# Patient Record
Sex: MALE | Race: WHITE | NOT HISPANIC OR LATINO | Employment: OTHER | URBAN - METROPOLITAN AREA
[De-identification: names, ages, dates, MRNs, and addresses within clinical notes are randomized per-mention and may not be internally consistent; named-entity substitution may affect disease eponyms.]

---

## 2019-12-17 PROBLEM — F41.9 ANXIETY: Status: ACTIVE | Noted: 2019-12-17

## 2020-02-27 ENCOUNTER — OFFICE VISIT (OUTPATIENT)
Dept: FAMILY MEDICINE CLINIC | Facility: CLINIC | Age: 25
End: 2020-02-27
Payer: COMMERCIAL

## 2020-02-27 VITALS
DIASTOLIC BLOOD PRESSURE: 76 MMHG | BODY MASS INDEX: 31.78 KG/M2 | OXYGEN SATURATION: 98 % | HEIGHT: 73 IN | WEIGHT: 239.8 LBS | SYSTOLIC BLOOD PRESSURE: 122 MMHG | RESPIRATION RATE: 16 BRPM

## 2020-02-27 DIAGNOSIS — Z71.6 ENCOUNTER FOR SMOKING CESSATION COUNSELING: ICD-10-CM

## 2020-02-27 DIAGNOSIS — F41.9 ANXIETY: Primary | ICD-10-CM

## 2020-02-27 DIAGNOSIS — Z13.220 SCREENING, LIPID: ICD-10-CM

## 2020-02-27 DIAGNOSIS — F43.10 PTSD (POST-TRAUMATIC STRESS DISORDER): ICD-10-CM

## 2020-02-27 DIAGNOSIS — E66.9 OBESITY, CLASS I, BMI 30.0-34.9 (SEE ACTUAL BMI): ICD-10-CM

## 2020-02-27 PROBLEM — Z79.899 MEDICAL MARIJUANA USE: Status: ACTIVE | Noted: 2020-02-27

## 2020-02-27 PROBLEM — E66.811 OBESITY, CLASS I, BMI 30.0-34.9 (SEE ACTUAL BMI): Status: ACTIVE | Noted: 2020-02-27

## 2020-02-27 PROBLEM — F10.10 ALCOHOL ABUSE: Status: ACTIVE | Noted: 2020-02-27

## 2020-02-27 PROBLEM — B00.9 HERPES SIMPLEX TYPE 2 INFECTION: Status: ACTIVE | Noted: 2020-02-27

## 2020-02-27 PROCEDURE — 4004F PT TOBACCO SCREEN RCVD TLK: CPT | Performed by: NURSE PRACTITIONER

## 2020-02-27 PROCEDURE — 3008F BODY MASS INDEX DOCD: CPT | Performed by: NURSE PRACTITIONER

## 2020-02-27 PROCEDURE — 99214 OFFICE O/P EST MOD 30 MIN: CPT | Performed by: NURSE PRACTITIONER

## 2020-02-27 RX ORDER — BUSPIRONE HYDROCHLORIDE 5 MG/1
5 TABLET ORAL 2 TIMES DAILY
Qty: 60 TABLET | Refills: 1 | Status: SHIPPED | OUTPATIENT
Start: 2020-02-27 | End: 2021-03-25 | Stop reason: ALTCHOICE

## 2020-02-27 RX ORDER — VARENICLINE TARTRATE 25 MG
KIT ORAL
Qty: 53 TABLET | Refills: 0 | Status: SHIPPED | OUTPATIENT
Start: 2020-02-27 | End: 2021-03-25 | Stop reason: ALTCHOICE

## 2020-02-27 NOTE — PROGRESS NOTES
OFFICE VISIT  Neyda Wade 21 y o  male MRN: [id number]    BMI Counseling: Body mass index is 31 64 kg/m²  The BMI is above normal  Nutrition recommendations include decreasing portion sizes  Exercise recommendations include moderate physical activity 150 minutes/week  Assessment / Plan:  Problem List Items Addressed This Visit        Other    Anxiety - Primary     Will avoid benzodiazepines, start buspar, discussed breathing techniques          Relevant Medications    busPIRone (BUSPAR) 5 mg tablet    PTSD (post-traumatic stress disorder)     Is prescribed the medical marijuana card, uses as needed  Relevant Medications    varenicline (CHANTIX DMITRI) 0 5 MG X 11 & 1 MG X 42 tablet    busPIRone (BUSPAR) 5 mg tablet    Obesity, Class I, BMI 30 0-34 9 (see actual BMI)     Dietary changes as advised, heart healthy diet  Obtain baseline labs          Relevant Orders    Hemoglobin A1C    CBC and differential    Comprehensive metabolic panel    TSH, 3rd generation with Free T4 reflex      Other Visit Diagnoses     Encounter for smoking cessation counseling        Relevant Medications    varenicline (CHANTIX DMITRI) 0 5 MG X 11 & 1 MG X 42 tablet    Screening, lipid        Relevant Orders    Lipid Panel with Direct LDL reflex            Reason For Visit / Chief Complaint  Chief Complaint   Patient presents with    re-establish care        HPI:  Neyda Wade is a 21 y o  male who presents today to [facility] care  He reports attempting to lose weight and attempting to reduce his alcohol intake  He admits to drinking 6 beers daily 7 days a week  He admits to a small amount of cocaine last week  He reports an episode of anxiety, panic attack while driving home from work, frequently occurring  He reports sweaty palms, shortness of breath  He reports calling the ambulance, did not go to the ED  He reported feeling better  He has reduced his cigarettes  He has tried reducing his medial marijuana use       Historical Information   Past Medical History:   Diagnosis Date    Anxiety      History reviewed  No pertinent surgical history  Social History   Social History     Substance and Sexual Activity   Alcohol Use Yes    Alcohol/week: 6 0 standard drinks    Types: 6 Cans of beer per week    Frequency: 4 or more times a week    Drinks per session: 5 or 6    Binge frequency: Weekly     Social History     Substance and Sexual Activity   Drug Use Yes    Types: Marijuana     Social History     Tobacco Use   Smoking Status Current Every Day Smoker    Years: 7 00    Types: Cigarettes   Smokeless Tobacco Current User     Family History   Problem Relation Age of Onset    No Known Problems Mother     No Known Problems Father     No Known Problems Sister     No Known Problems Brother        Meds/Allergies   No Known Allergies    Meds:    Current Outpatient Medications:     busPIRone (BUSPAR) 5 mg tablet, Take 1 tablet (5 mg total) by mouth 2 (two) times a day, Disp: 60 tablet, Rfl: 1    varenicline (CHANTIX DMITRI) 0 5 MG X 11 & 1 MG X 42 tablet, Take one 0 5 mg tablet by mouth once daily for 3 days, then one 0 5 mg tablet by mouth twice daily for 4 days, then one 1 mg tablet by mouth twice daily  , Disp: 53 tablet, Rfl: 0      REVIEW OF SYSTEMS  Review of Systems   Constitutional: Negative for appetite change, fatigue and fever  HENT: Negative for congestion, ear discharge, ear pain and postnasal drip  Eyes: Negative for pain, discharge, redness, itching and visual disturbance  Respiratory: Negative for chest tightness, shortness of breath and wheezing  Cardiovascular: Negative for chest pain, palpitations and leg swelling  Gastrointestinal: Negative for abdominal distention, abdominal pain, blood in stool, diarrhea, nausea and vomiting  Endocrine: Negative for cold intolerance, heat intolerance, polydipsia, polyphagia and polyuria     Genitourinary: Negative for decreased urine volume, difficulty urinating, dysuria, frequency, hematuria, testicular pain and urgency  Musculoskeletal: Negative for arthralgias, back pain, myalgias, neck pain and neck stiffness  Skin: Negative for color change, pallor, rash and wound  Neurological: Negative for dizziness, light-headedness, numbness and headaches  Hematological: Negative for adenopathy  Does not bruise/bleed easily  Psychiatric/Behavioral: Positive for sleep disturbance  Negative for self-injury and suicidal ideas  The patient is nervous/anxious  Current Vitals:   Blood Pressure: 122/76 (02/27/20 1207)  Respirations: 16 (02/27/20 1207)  Height: 6' 1" (185 4 cm) (02/27/20 1207)  Weight - Scale: 109 kg (239 lb 12 8 oz) (02/27/20 1207)  SpO2: 98 % (02/27/20 1207)  [unfilled]    PHYSICAL EXAMS:  Physical Exam   Constitutional: He is oriented to person, place, and time  He appears well-developed and well-nourished  HENT:   Head: Normocephalic and atraumatic  Right Ear: External ear normal    Left Ear: External ear normal    Nose: Nose normal    Mouth/Throat: Oropharynx is clear and moist    Eyes: Pupils are equal, round, and reactive to light  Conjunctivae are normal  Right eye exhibits no discharge  Left eye exhibits no discharge  Neck: Normal range of motion  Neck supple  No thyromegaly present  Cardiovascular: Normal rate, regular rhythm and normal heart sounds  Pulmonary/Chest: Effort normal and breath sounds normal    Abdominal: Soft  Bowel sounds are normal  He exhibits no distension  There is no tenderness  Musculoskeletal: Normal range of motion  He exhibits no edema, tenderness or deformity  Neurological: He is alert and oriented to person, place, and time  Skin: Skin is warm and dry  No rash noted  No erythema  Psychiatric: He has a normal mood and affect  His behavior is normal            Lab, imaging and other studies: I have personally reviewed pertinent reports  Yulia Beal

## 2020-07-31 ENCOUNTER — APPOINTMENT (EMERGENCY)
Dept: ULTRASOUND IMAGING | Facility: HOSPITAL | Age: 25
End: 2020-07-31
Payer: COMMERCIAL

## 2020-07-31 ENCOUNTER — APPOINTMENT (EMERGENCY)
Dept: CT IMAGING | Facility: HOSPITAL | Age: 25
End: 2020-07-31
Payer: COMMERCIAL

## 2020-07-31 ENCOUNTER — HOSPITAL ENCOUNTER (EMERGENCY)
Facility: HOSPITAL | Age: 25
Discharge: HOME | End: 2020-07-31
Attending: EMERGENCY MEDICINE | Admitting: EMERGENCY MEDICINE
Payer: COMMERCIAL

## 2020-07-31 VITALS
SYSTOLIC BLOOD PRESSURE: 129 MMHG | RESPIRATION RATE: 18 BRPM | HEIGHT: 74 IN | DIASTOLIC BLOOD PRESSURE: 62 MMHG | TEMPERATURE: 99.2 F | BODY MASS INDEX: 30.16 KG/M2 | WEIGHT: 235.01 LBS | HEART RATE: 79 BPM | OXYGEN SATURATION: 98 %

## 2020-07-31 DIAGNOSIS — R10.32 LLQ PAIN: ICD-10-CM

## 2020-07-31 DIAGNOSIS — K57.92 ACUTE DIVERTICULITIS: Primary | ICD-10-CM

## 2020-07-31 LAB
ANION GAP SERPL CALCULATED.3IONS-SCNC: 6 MMOL/L (ref 4–13)
BASOPHILS # BLD AUTO: 0.05 THOUSANDS/ΜL (ref 0–0.1)
BASOPHILS NFR BLD AUTO: 0 % (ref 0–1)
BILIRUB UR QL STRIP: NEGATIVE
BUN SERPL-MCNC: 12 MG/DL (ref 5–25)
CALCIUM SERPL-MCNC: 9.7 MG/DL (ref 8.3–10.1)
CHLORIDE SERPL-SCNC: 104 MMOL/L (ref 100–108)
CLARITY UR: CLEAR
CO2 SERPL-SCNC: 27 MMOL/L (ref 21–32)
COLOR UR: YELLOW
CREAT SERPL-MCNC: 0.89 MG/DL (ref 0.6–1.3)
EOSINOPHIL # BLD AUTO: 0.13 THOUSAND/ΜL (ref 0–0.61)
EOSINOPHIL NFR BLD AUTO: 1 % (ref 0–6)
ERYTHROCYTE [DISTWIDTH] IN BLOOD BY AUTOMATED COUNT: 12.2 % (ref 11.6–15.1)
GFR SERPL CREATININE-BSD FRML MDRD: 118 ML/MIN/1.73SQ M
GLUCOSE SERPL-MCNC: 94 MG/DL (ref 65–140)
GLUCOSE UR STRIP-MCNC: NEGATIVE MG/DL
HCT VFR BLD AUTO: 48.4 % (ref 36.5–49.3)
HGB BLD-MCNC: 16.6 G/DL (ref 12–17)
HGB UR QL STRIP.AUTO: NEGATIVE
IMM GRANULOCYTES # BLD AUTO: 0.06 THOUSAND/UL (ref 0–0.2)
IMM GRANULOCYTES NFR BLD AUTO: 0 % (ref 0–2)
KETONES UR STRIP-MCNC: NEGATIVE MG/DL
LEUKOCYTE ESTERASE UR QL STRIP: NEGATIVE
LYMPHOCYTES # BLD AUTO: 1.4 THOUSANDS/ΜL (ref 0.6–4.47)
LYMPHOCYTES NFR BLD AUTO: 9 % (ref 14–44)
MCH RBC QN AUTO: 30.9 PG (ref 26.8–34.3)
MCHC RBC AUTO-ENTMCNC: 34.3 G/DL (ref 31.4–37.4)
MCV RBC AUTO: 90 FL (ref 82–98)
MONOCYTES # BLD AUTO: 0.94 THOUSAND/ΜL (ref 0.17–1.22)
MONOCYTES NFR BLD AUTO: 6 % (ref 4–12)
NEUTROPHILS # BLD AUTO: 13.08 THOUSANDS/ΜL (ref 1.85–7.62)
NEUTS SEG NFR BLD AUTO: 84 % (ref 43–75)
NITRITE UR QL STRIP: NEGATIVE
NRBC BLD AUTO-RTO: 0 /100 WBCS
PH UR STRIP.AUTO: 7.5 [PH]
PLATELET # BLD AUTO: 229 THOUSANDS/UL (ref 149–390)
PMV BLD AUTO: 10 FL (ref 8.9–12.7)
POTASSIUM SERPL-SCNC: 4.6 MMOL/L (ref 3.5–5.3)
PROT UR STRIP-MCNC: NEGATIVE MG/DL
RBC # BLD AUTO: 5.38 MILLION/UL (ref 3.88–5.62)
SODIUM SERPL-SCNC: 137 MMOL/L (ref 136–145)
SP GR UR STRIP.AUTO: 1.01 (ref 1–1.03)
UROBILINOGEN UR QL STRIP.AUTO: 0.2 E.U./DL
WBC # BLD AUTO: 15.66 THOUSAND/UL (ref 4.31–10.16)

## 2020-07-31 PROCEDURE — 96374 THER/PROPH/DIAG INJ IV PUSH: CPT

## 2020-07-31 PROCEDURE — 36415 COLL VENOUS BLD VENIPUNCTURE: CPT | Performed by: PHYSICIAN ASSISTANT

## 2020-07-31 PROCEDURE — 96361 HYDRATE IV INFUSION ADD-ON: CPT

## 2020-07-31 PROCEDURE — 76870 US EXAM SCROTUM: CPT

## 2020-07-31 PROCEDURE — 80048 BASIC METABOLIC PNL TOTAL CA: CPT | Performed by: PHYSICIAN ASSISTANT

## 2020-07-31 PROCEDURE — 81003 URINALYSIS AUTO W/O SCOPE: CPT | Performed by: PHYSICIAN ASSISTANT

## 2020-07-31 PROCEDURE — 74177 CT ABD & PELVIS W/CONTRAST: CPT

## 2020-07-31 PROCEDURE — 99284 EMERGENCY DEPT VISIT MOD MDM: CPT | Performed by: PHYSICIAN ASSISTANT

## 2020-07-31 PROCEDURE — 99284 EMERGENCY DEPT VISIT MOD MDM: CPT

## 2020-07-31 PROCEDURE — 85025 COMPLETE CBC W/AUTO DIFF WBC: CPT | Performed by: PHYSICIAN ASSISTANT

## 2020-07-31 RX ORDER — METRONIDAZOLE 500 MG/1
500 TABLET ORAL ONCE
Status: COMPLETED | OUTPATIENT
Start: 2020-07-31 | End: 2020-07-31

## 2020-07-31 RX ORDER — SULFAMETHOXAZOLE AND TRIMETHOPRIM 800; 160 MG/1; MG/1
1 TABLET ORAL ONCE
Status: COMPLETED | OUTPATIENT
Start: 2020-07-31 | End: 2020-07-31

## 2020-07-31 RX ORDER — KETOROLAC TROMETHAMINE 30 MG/ML
30 INJECTION, SOLUTION INTRAMUSCULAR; INTRAVENOUS ONCE
Status: COMPLETED | OUTPATIENT
Start: 2020-07-31 | End: 2020-07-31

## 2020-07-31 RX ORDER — SULFAMETHOXAZOLE AND TRIMETHOPRIM 800; 160 MG/1; MG/1
1 TABLET ORAL EVERY 12 HOURS SCHEDULED
Qty: 20 TABLET | Refills: 0 | Status: SHIPPED | OUTPATIENT
Start: 2020-07-31 | End: 2020-08-10

## 2020-07-31 RX ORDER — METRONIDAZOLE 500 MG/1
500 TABLET ORAL EVERY 8 HOURS SCHEDULED
Qty: 30 TABLET | Refills: 0 | Status: SHIPPED | OUTPATIENT
Start: 2020-07-31 | End: 2020-08-10

## 2020-07-31 RX ADMIN — SODIUM CHLORIDE 1000 ML: 0.9 INJECTION, SOLUTION INTRAVENOUS at 01:47

## 2020-07-31 RX ADMIN — IOHEXOL 100 ML: 350 INJECTION, SOLUTION INTRAVENOUS at 02:50

## 2020-07-31 RX ADMIN — SULFAMETHOXAZOLE AND TRIMETHOPRIM 1 TABLET: 800; 160 TABLET ORAL at 03:50

## 2020-07-31 RX ADMIN — METRONIDAZOLE 500 MG: 500 TABLET ORAL at 03:50

## 2020-07-31 RX ADMIN — KETOROLAC TROMETHAMINE 30 MG: 30 INJECTION, SOLUTION INTRAMUSCULAR at 01:47

## 2020-07-31 NOTE — ED NOTES
Patient walked out of department stable     Andry Nati, [facility]  07/31/20 1086

## 2020-07-31 NOTE — ED PROVIDER NOTES
History  Chief Complaint   Patient presents with    Abdominal Pain     Patient presents with LLQ abd pain that radiates to the Lt lower back x 1 day states is having left sided groin pain as well since yesterday and has been worse since he "masterbated"      39-year-old male with past medical history significant for anxiety and PTSD who presents to the emergency department for complaint of left lower quadrant abdominal pain beginning yesterday described as aching, increased in intensity tonight after masturbating and now radiating into the left testicle  Patient denies ever experiencing this before  He denies any nausea, vomiting, fever, chills  States he felt constipated yesterday, last bowel movement two days ago, no hematochezia, melena, or mucus stools  Denies any urinary symptoms or hx of UTI, nephrolithiasis  Denies any scrotal swelling, rash, lesion, or other skin color changes  No hx of abdominal surgeries  Patient reports smoking small amounts of marijuana daily, no other drug or large alcohol use  Prior to Admission Medications   Prescriptions Last Dose Informant Patient Reported? Taking?   busPIRone (BUSPAR) 5 mg tablet   No No   Sig: Take 1 tablet (5 mg total) by mouth 2 (two) times a day   varenicline (CHANTIX DMITRI) 0 5 MG X 11 & 1 MG X 42 tablet   No No   Sig: Take one 0 5 mg tablet by mouth once daily for 3 days, then one 0 5 mg tablet by mouth twice daily for 4 days, then one 1 mg tablet by mouth twice daily  Facility-Administered Medications: None       Past Medical History:   Diagnosis Date    Anxiety        No past surgical history on file  Family History   Problem Relation Age of Onset    No Known Problems Mother     No Known Problems Father     No Known Problems Sister     No Known Problems Brother      I have reviewed and agree with the history as documented      E-Cigarette/Vaping    E-Cigarette Use Current Some Day User      E-Cigarette/Vaping Substances    Nicotine Yes     THC Yes     Flavoring No      Social History     Tobacco Use    Smoking status: Current Every Day Smoker     Years: 7 00     Types: Cigarettes    Smokeless tobacco: Current User   Substance Use Topics    Alcohol use: Not Currently     Alcohol/week: 6 0 standard drinks     Types: 6 Cans of beer per week    Drug use: Yes     Types: Marijuana       Review of Systems   Constitutional: Negative for appetite change, chills, fatigue and fever  Gastrointestinal: Positive for abdominal pain and constipation  Negative for abdominal distention, blood in stool, diarrhea, nausea and vomiting  Genitourinary: Positive for testicular pain  Negative for decreased urine volume, difficulty urinating, discharge, dysuria, flank pain, frequency, hematuria, penile pain, penile swelling, scrotal swelling and urgency  Musculoskeletal: Negative for back pain and myalgias  Skin: Negative for color change and rash  Neurological: Negative for dizziness, weakness, light-headedness and headaches  All other systems reviewed and are negative  Physical Exam  Physical Exam   Constitutional: He is oriented to person, place, and time  He appears well-developed and well-nourished  He is cooperative  Non-toxic appearance  No distress  HENT:   Head: Normocephalic and atraumatic  Mouth/Throat: Uvula is midline, oropharynx is clear and moist and mucous membranes are normal    Eyes: Pupils are equal, round, and reactive to light  Conjunctivae and EOM are normal    Neck: Normal range of motion  Neck supple  Cardiovascular: Normal rate, regular rhythm, intact distal pulses and normal pulses  Pulmonary/Chest: Effort normal and breath sounds normal    Abdominal: Soft  Normal appearance and bowel sounds are normal  He exhibits no distension and no mass  There is no hepatosplenomegaly  There is tenderness in the left lower quadrant  There is guarding  There is no rigidity, no rebound and no CVA tenderness  No hernia   Hernia confirmed negative in the right inguinal area and confirmed negative in the left inguinal area  Genitourinary: Penis normal  Right testis shows no mass, no swelling and no tenderness  Right testis is descended  Left testis shows tenderness  Left testis shows no mass and no swelling  Left testis is descended  No discharge found  Musculoskeletal: Normal range of motion  Lymphadenopathy: No inguinal adenopathy noted on the right or left side  Neurological: He is alert and oriented to person, place, and time  Skin: Skin is warm  Capillary refill takes less than 2 seconds  No lesion and no rash noted  No erythema  Vitals reviewed        Vital Signs  ED Triage Vitals   Temperature Pulse Respirations Blood Pressure SpO2   07/31/20 0149 07/31/20 0043 07/31/20 0043 07/31/20 0043 07/31/20 0043   99 2 °F (37 3 °C) 98 20 153/82 100 %      Temp Source Heart Rate Source Patient Position - Orthostatic VS BP Location FiO2 (%)   07/31/20 0149 07/31/20 0043 07/31/20 0043 07/31/20 0043 --   Oral Monitor Lying Right arm       Pain Score       07/31/20 0147       5           Vitals:    07/31/20 0100 07/31/20 0230 07/31/20 0300 07/31/20 0400   BP: 138/70 120/68 124/59 129/62   Pulse: 86 91 79 79   Patient Position - Orthostatic VS: Lying Lying Lying Lying         Visual Acuity      ED Medications  Medications   sodium chloride 0 9 % bolus 1,000 mL (0 mL Intravenous Stopped 7/31/20 0350)   ketorolac (TORADOL) injection 30 mg (30 mg Intravenous Given 7/31/20 0147)   iohexol (OMNIPAQUE) 350 MG/ML injection (MULTI-DOSE) 100 mL (100 mL Intravenous Given 7/31/20 0250)   sulfamethoxazole-trimethoprim (BACTRIM DS) 800-160 mg per tablet 1 tablet (1 tablet Oral Given 7/31/20 0350)   metroNIDAZOLE (FLAGYL) tablet 500 mg (500 mg Oral Given 7/31/20 0350)       Diagnostic Studies  Results Reviewed     Procedure Component Value Units Date/Time    UA w Reflex to Microscopic w Reflex to Culture [[id number]] Collected:  07/31/20 0233    Lab Status:  Final result Specimen:  Urine, Clean Catch Updated:  07/31/20 0241     Color, UA Yellow     Clarity, UA Clear     Specific Gravity, UA 1 015     pH, UA 7 5     Leukocytes, UA Negative     Nitrite, UA Negative     Protein, UA Negative mg/dl      Glucose, UA Negative mg/dl      Ketones, UA Negative mg/dl      Urobilinogen, UA 0 2 E U /dl      Bilirubin, UA Negative     Blood, UA Negative    Basic metabolic panel [[id number]] Collected:  07/31/20 0146    Lab Status:  Final result Specimen:  Blood from Hand, Left Updated:  07/31/20 0206     Sodium 137 mmol/L      Potassium 4 6 mmol/L      Chloride 104 mmol/L      CO2 27 mmol/L      ANION GAP 6 mmol/L      BUN 12 mg/dL      Creatinine 0 89 mg/dL      Glucose 94 mg/dL      Calcium 9 7 mg/dL      eGFR 118 ml/min/1 73sq m     Narrative:       National Kidney Disease Foundation guidelines for Chronic Kidney Disease (CKD):     Stage 1 with normal or high GFR (GFR > 90 mL/min/1 73 square meters)    Stage 2 Mild CKD (GFR = 60-89 mL/min/1 73 square meters)    Stage 3A Moderate CKD (GFR = 45-59 mL/min/1 73 square meters)    Stage 3B Moderate CKD (GFR = 30-44 mL/min/1 73 square meters)    Stage 4 Severe CKD (GFR = 15-29 mL/min/1 73 square meters)    Stage 5 End Stage CKD (GFR <15 mL/min/1 73 square meters)  Note: GFR calculation is accurate only with a steady state creatinine    CBC and differential [[id number]]  (Abnormal) Collected:  07/31/20 0146    Lab Status:  Final result Specimen:  Blood from Hand, Left Updated:  07/31/20 0154     WBC 15 66 Thousand/uL      RBC 5 38 Million/uL      Hemoglobin 16 6 g/dL      Hematocrit 48 4 %      MCV 90 fL      MCH 30 9 pg      MCHC 34 3 g/dL      RDW 12 2 %      MPV 10 0 fL      Platelets 837 Thousands/uL      nRBC 0 /100 WBCs      Neutrophils Relative 84 %      Immat GRANS % 0 %      Lymphocytes Relative 9 %      Monocytes Relative 6 %      Eosinophils Relative 1 %      Basophils Relative 0 %      Neutrophils Absolute 13 08 Thousands/µL      Immature Grans Absolute 0 06 Thousand/uL      Lymphocytes Absolute 1 40 Thousands/µL      Monocytes Absolute 0 94 Thousand/µL      Eosinophils Absolute 0 13 Thousand/µL      Basophils Absolute 0 05 Thousands/µL                  CT abdomen pelvis with contrast   Final Result by Hawa Choi DO (07/31 0300)      Colonic diverticulosis with inflammatory changes around the sigmoid colon which may represent acute diverticulitis  No drainable fluid collection  If not already performed recently, colonoscopy after the acute illness is recommended to rule out possibility of colon cancer mimicking diverticulitis  Workstation performed: [id number]         US scrotum and testicles   Final Result by Eveline Burgess MD (07/31 2546)       1  No evidence of testicular torsion  2   Targeted ultrasound of the left lower quadrant in the area of pain demonstrates a noncompressible bowel loop, incompletely evaluated  Consider further evaluation with CT abdomen and pelvis, if clinically indicated  Workstation performed: [id number]                    Procedures  Procedures         ED Course  ED Course as of Aug 05 1801   Fri Jul 31, 2020   0138 Per tech, US showing non compressible mass in the LLQ  Will await official read, CT if we need further evaluation and patient is agreeable          US AUDIT      Most Recent Value   Initial Alcohol Screen: US AUDIT-C    1  How often do you have a drink containing alcohol? 1 Filed at: 07/31/2020 0044   2  How many drinks containing alcohol do you have on a typical day you are drinking? 1 Filed at: 07/31/2020 0044   3a  Male UNDER 65: How often do you have five or more drinks on one occasion? 0 Filed at: 07/31/2020 0044   3b  FEMALE Any Age, or MALE 65+: How often do you have 4 or more drinks on one occassion?   0 Filed at: 07/31/2020 0044   Audit-C Score  2 Filed at: 07/31/2020 0044                  ASTON/DAST-10      Most Recent Value   How many times in the past year have you    Used an illegal drug or used a prescription medication for non-medical reasons? Never Filed at: 07/31/2020 0044                                MDM  Number of Diagnoses or Management Options  Acute diverticulitis:   LLQ pain:   Diagnosis management comments: On exam, patient is severely tender to light palpation in the left lower quadrant, with guarding, also moderately tender at the left testicle  No scrotal swelling or skin color changes  No suspicious masses palpable on the bilateral testicles  Must rule out testicular torsion, especially as pain began acutely post masturbation, will pursue ultrasound  Also consider UTI, nephrolithiasis, epididymitis, prostatitis, orchitis, acute diverticulitis though this is less likely due to age, constipation, volvulus, colonic mass, acute appendicitis, infectious colitis, enteritis    Will admin Toradol for pain and reassess       Amount and/or Complexity of Data Reviewed  Clinical lab tests: ordered and reviewed  Tests in the radiology section of CPT®: ordered and reviewed  Discussion of test results with the performing providers: yes  Decide to obtain previous medical records or to obtain history from someone other than the patient: yes  Obtain history from someone other than the patient: yes  Review and summarize past medical records: yes  Discuss the patient with other providers: yes  Independent visualization of images, tracings, or specimens: yes    Risk of Complications, Morbidity, and/or Mortality  General comments: See ED course note for dispo and plan  CT showing acute diverticulitis  Will treat with combo of Bactrim and Flagyl  Discussed with patient that due to this type of infection at an early age, he needs to f/u with gastroenterology for colonoscopy after infection resolves, to rule out any other pathology, including colonic malignancy, which may be present and currently being classified as diverticulitis on CT scan   Will refer  Pain improved after meds  Advised antiinflammatory q4-6h with food  Discussed low residue, BRAT diet and only beginning high fiber diet once infection resolves  Instructed return to ED for any sudden worsening of pain or worsening symptoms from day 0 (discharge) to day 3 of antibiotics  He should also return if symptoms are not significantly improved by day 3 of antibiotics, as this may indicate antibiotic failure, which could result in worsening of infection, abscess, fistula, perforation, sepsis, or death if left improperly treated  I reviewed and discussed all lab and imaging findings with the patient at bedside  I discussed emergency department return parameters  I answered any and all questions the patient had regarding emergency department course of evaluation and treatment  The patient verbalized understanding of and agreement with plan  Patient Progress  Patient progress: improved        Disposition  Final diagnoses:   Acute diverticulitis   LLQ pain     Time reflects when diagnosis was documented in both MDM as applicable and the Disposition within this note     Time User Action Codes Description Comment    7/31/2020  3:32 AM Lynda Ge [K57 [id number]] Acute diverticulitis     7/31/2020  3:32 AM Lynda Ge [R10 [id number]] LLQ pain       ED Disposition     ED Disposition Condition Date/Time Comment    Discharge Stable Fri Jul 31, 2020  3:31 AM Tima Wilkinson discharge to home/self care              Follow-up Information     Follow up With Specialties Details Why Contact Info Additional Information    Catalina Bowen,  Family Medicine Schedule an appointment as soon as possible for a visit in 3 days For further evaluation [address] [facility] [address]  [address] [facility] Emergency Medicine Go to  If symptoms worsen [address] [facility]  [phone] [place] ED, [address], [place], [address] Chintan Veliz MD Gastroenterology Schedule an appointment as soon as possible for a visit in 1 week For further evaluation [address]  [facility] [address] [facility]             Discharge Medication List as of 7/31/2020  3:34 AM      START taking these medications    Details   metroNIDAZOLE (FLAGYL) 500 mg tablet Take 1 tablet (500 mg total) by mouth every 8 (eight) hours for 10 days, Starting Fri 7/31/2020, Until Mon 8/10/2020, Normal      sulfamethoxazole-trimethoprim (BACTRIM DS) 800-160 mg per tablet Take 1 tablet by mouth every 12 (twelve) hours for 10 days, Starting Fri 7/31/2020, Until Mon 8/10/2020, Normal         CONTINUE these medications which have NOT CHANGED    Details   busPIRone (BUSPAR) 5 mg tablet Take 1 tablet (5 mg total) by mouth 2 (two) times a day, Starting Thu 2/27/2020, Normal      varenicline (CHANTIX DMITRI) 0 5 MG X 11 & 1 MG X 42 tablet Take one 0 5 mg tablet by mouth once daily for 3 days, then one 0 5 mg tablet by mouth twice daily for 4 days, then one 1 mg tablet by mouth twice daily  , Normal           No discharge procedures on file      PDMP Review     None          ED Provider  Electronically Signed by           Abdulkadir Rick PA-C  08/05/20 2156

## 2020-07-31 NOTE — ED NOTES
Patient transported to [address] [facility], [address] [place] [address]  07/31/20 6242

## 2020-08-02 ENCOUNTER — NURSE TRIAGE (OUTPATIENT)
Dept: OTHER | Facility: OTHER | Age: 25
End: 2020-08-02

## 2020-08-02 NOTE — TELEPHONE ENCOUNTER
Patient called back and said to try calling him on [phone] as he is having problems with this cell phone

## 2020-08-02 NOTE — TELEPHONE ENCOUNTER
Second attempt to reach patient  Goes directly to VM  Unable to leave message, closing chart due to no contact

## 2020-08-02 NOTE — TELEPHONE ENCOUNTER
Reason for Disposition   Mild constipation    Answer Assessment - Initial Assessment Questions  1  STOOL PATTERN OR FREQUENCY: "How often do you pass bowel movements (BMs)?"  (Normal range: tid to q 3 days)  "When was the last BM passed?"        Usually has a BM today  2  STRAINING: "Do you have to strain to have a BM?"       Denies  3  RECTAL PAIN: "Does your rectum hurt when the stool comes out?" If so, ask: "Do you have hemorrhoids? How bad is the pain?"  (Scale 1-10; or mild, moderate, severe)        4  STOOL COMPOSITION: "Are the stools hard?"       Small    5  BLOOD ON STOOLS: "Has there been any blood on the toilet tissue or on the surface of the BM?" If so, ask: "When was the last time?"      Denies    6  CHRONIC CONSTIPATION: "Is this a new problem for you?"  If no, ask: "How long have you had this problem?" (days, weeks, months)       Denies  7  CHANGES IN DIET: "Have there been any recent changes in your diet?"       Has not been eating much  8  MEDICATIONS: "Have you been taking any new medications?"        9  LAXATIVES: "Have you been using any laxatives or enemas?"  If yes, ask "What, how often, and when was the last time?"        10   CAUSE: "What do you think is causing the constipation?"           11  OTHER SYMPTOMS: "Do you have any other symptoms?" (e g , abdominal pain, fever, vomiting)    Protocols used: CONSTIPATION-ADULT-

## 2020-08-02 NOTE — TELEPHONE ENCOUNTER
Regarding: CONSTIPATION   ----- Message from Jairo Khanna sent at 8/2/2020  1:43 PM EDT -----  Patient went to ER diagnosed with diverticulitis on Friday  Patient hasn't had a bowel movement and feels nauseous  Patient wants to know if anything can help him pass a bowel movement  Patient feels blocked

## 2020-08-27 ENCOUNTER — OFFICE VISIT (OUTPATIENT)
Dept: GASTROENTEROLOGY | Facility: CLINIC | Age: 25
End: 2020-08-27

## 2020-08-27 ENCOUNTER — TELEPHONE (OUTPATIENT)
Dept: GASTROENTEROLOGY | Facility: CLINIC | Age: 25
End: 2020-08-27

## 2020-08-27 VITALS
HEART RATE: 98 BPM | DIASTOLIC BLOOD PRESSURE: 80 MMHG | BODY MASS INDEX: 28.62 KG/M2 | TEMPERATURE: 97.5 F | RESPIRATION RATE: 18 BRPM | SYSTOLIC BLOOD PRESSURE: 120 MMHG | HEIGHT: 74 IN | WEIGHT: 223 LBS

## 2020-08-27 DIAGNOSIS — K57.32 DIVERTICULITIS OF LARGE INTESTINE WITHOUT PERFORATION OR ABSCESS WITHOUT BLEEDING: Primary | ICD-10-CM

## 2020-08-27 PROCEDURE — 3008F BODY MASS INDEX DOCD: CPT | Performed by: NURSE PRACTITIONER

## 2020-08-27 PROCEDURE — 4004F PT TOBACCO SCREEN RCVD TLK: CPT | Performed by: PHYSICIAN ASSISTANT

## 2020-08-27 PROCEDURE — 99203 OFFICE O/P NEW LOW 30 MIN: CPT | Performed by: PHYSICIAN ASSISTANT

## 2020-08-27 PROCEDURE — 3008F BODY MASS INDEX DOCD: CPT | Performed by: PHYSICIAN ASSISTANT

## 2020-08-27 NOTE — PROGRESS NOTES
[facility] - Outpatient Consultation  Tima Wilkinson 31 y o  male MRN: [id number]  Encounter: [id number]          ASSESSMENT AND PLAN:      1  Diverticulitis of large intestine without perforation or abscess without bleeding    Patient developed the acute onset of LLQ pain and fever and went to the ER  CT Scan was performed which showed acute diverticulitis  He was treated with a Cipro/Flagyl course and the pain improved  He does have some lingering mild discomfort and bloating at times  He reports a grandparent with colon cancer  Will plan for colonoscopy in 4 weeks (8 weeks after the episode) to rule out an underlying malignancy  Recommend a high fiber diet and probiotic course   ______________________________________________________________________    HPI:  Patient is a 58-year-old male who presents to the office for an evaluation of diverticulitis  Patient reports he developed the acute onset of left lower quadrant pain and fever back in the end of July and went to the ER for an evaluation  A CT scan of the abdomen that was performed which showed evidence of acute diverticulitis  His white blood cell count was also elevated at the time  He was treated with a Cipro and Flagyl course and the pain improved  He does report some lingering bloating and mild discomfort but nothing like the severity he had with the acute episode  He reports he has never had diverticulitis before  He did any issues with diarrhea  He does report occasional constipation  No rectal bleeding  He denies any family history of ulcerative colitis or Crohn's disease  He reports a grandparent with colon cancer  He has never had a colonoscopy before  REVIEW OF SYSTEMS:    CONSTITUTIONAL: Denies any fever, chills, rigors, and weight loss  HEENT: No earache or tinnitus  Denies hearing loss or visual disturbances  CARDIOVASCULAR: No chest pain or palpitations     RESPIRATORY: Denies any cough, hemoptysis, shortness of breath or dyspnea on exertion  GASTROINTESTINAL: As noted in the History of Present Illness  GENITOURINARY: No problems with urination  Denies any hematuria or dysuria  NEUROLOGIC: No dizziness or vertigo, denies headaches  MUSCULOSKELETAL: Denies any muscle or joint pain  SKIN: Denies skin rashes or itching  ENDOCRINE: Denies excessive thirst  Denies intolerance to heat or cold  PSYCHOSOCIAL: Denies depression or anxiety  Denies any recent memory loss  Historical Information   Past Medical History:   Diagnosis Date    Anxiety      History reviewed  No pertinent surgical history  Social History   Social History     Substance and Sexual Activity   Alcohol Use Not Currently    Alcohol/week: 6 0 standard drinks    Types: 6 Cans of beer per week     Social History     Substance and Sexual Activity   Drug Use Yes    Types: Marijuana     Social History     Tobacco Use   Smoking Status Current Every Day Smoker    Years: 7 00    Types: Cigarettes   Smokeless Tobacco Current User     Family History   Problem Relation Age of Onset    No Known Problems Mother     No Known Problems Father     No Known Problems Sister     No Known Problems Brother        Meds/Allergies       Current Outpatient Medications:     busPIRone (BUSPAR) 5 mg tablet    varenicline (CHANTIX DMITRI) 0 5 MG X 11 & 1 MG X 42 tablet    No Known Allergies        Objective     Blood pressure 120/80, pulse 98, temperature 97 5 °F (36 4 °C), resp  rate 18, height 6' 2" (1 88 m), weight 101 kg (223 lb)  Body mass index is 28 63 kg/m²  PHYSICAL EXAM:      General Appearance:   Alert, cooperative, no distress   HEENT:   Normocephalic, atraumatic, anicteric     Neck:  Supple, symmetrical, trachea midline   Lungs:   Clear to auscultation bilaterally; no rales, rhonchi or wheezing; respirations unlabored    Heart[de-identified]   Regular rate and rhythm; no murmur, rub, or gallop     Abdomen:   Soft, non-tender, non-distended; normal bowel sounds; no masses, no organomegaly    Genitalia:   Deferred    Rectal:   Deferred    Extremities:  No cyanosis, clubbing or edema    Pulses:  2+ and symmetric    Skin:  No jaundice, rashes, or lesions    Lymph nodes:  No palpable cervical lymphadenopathy        Lab Results:   No visits with results within 1 Day(s) from this visit     Latest known visit with results is:   Admission on 07/31/2020, Discharged on 07/31/2020   Component Date Value    WBC 07/31/2020 15 66*    RBC 07/31/2020 5 38     Hemoglobin 07/31/2020 16 6     Hematocrit 07/31/2020 48 4     MCV 07/31/2020 90     MCH 07/31/2020 30 9     MCHC 07/31/2020 34 3     RDW 07/31/2020 12 2     MPV 07/31/2020 10 0     Platelets 54/46/4356 229     nRBC 07/31/2020 0     Neutrophils Relative 07/31/2020 84*    Immat GRANS % 07/31/2020 0     Lymphocytes Relative 07/31/2020 9*    Monocytes Relative 07/31/2020 6     Eosinophils Relative 07/31/2020 1     Basophils Relative 07/31/2020 0     Neutrophils Absolute 07/31/2020 13 08*    Immature Grans Absolute 07/31/2020 0 06     Lymphocytes Absolute 07/31/2020 1 40     Monocytes Absolute 07/31/2020 0 94     Eosinophils Absolute 07/31/2020 0 13     Basophils Absolute 07/31/2020 0 05     Sodium 07/31/2020 137     Potassium 07/31/2020 4 6     Chloride 07/31/2020 104     CO2 07/31/2020 27     ANION GAP 07/31/2020 6     BUN 07/31/2020 12     Creatinine 07/31/2020 0 89     Glucose 07/31/2020 94     Calcium 07/31/2020 9 7     eGFR 07/31/2020 118     Color, UA 07/31/2020 Yellow     Clarity, UA 07/31/2020 Clear     Specific Gravity, UA 07/31/2020 1 015     pH, UA 07/31/2020 7 5     Leukocytes, UA 07/31/2020 Negative     Nitrite, UA 07/31/2020 Negative     Protein, UA 07/31/2020 Negative     Glucose, UA 07/31/2020 Negative     Ketones, UA 07/31/2020 Negative     Urobilinogen, UA 07/31/2020 0 2     Bilirubin, UA 07/31/2020 Negative     Blood, UA 07/31/2020 Negative Radiology Results:   Us Scrotum And Testicles    Result Date: 7/31/2020  Narrative: SCROTAL ULTRASOUND INDICATION:    r/o torsion left  COMPARISON: None TECHNIQUE:   Ultrasound the scrotal contents was performed with a high frequency linear transducer utilizing volumetric sweep imaging as well as standard still image techniques  Imaging performed in longitudinal and transverse orientation  Color and spectral Doppler evaluation also performed bilaterally  In addition, targeted ultrasound of the left lower quadrant in the area of pain was performed  FINDINGS: TESTES: Testes are symmetric and normal in size  RIGHT testis = 3 7 x 2 0 x 2 7 cm Normal contour with homogeneous smooth echotexture  No intratesticular mass lesion or calcifications  LEFT testis = 3 4 x 2 1 x 2 3 cm Normal contour with homogeneous smooth echotexture  Echogenic lesion in the anterior aspect of the left testicle measures 2 mm  Doppler flow within both testes is present and symmetric  EPIDIDYMIDES: Normal Size  Doppler ultrasound demonstrates normal blood flow  No epididymal lesions  HYDROCELE:  No significant fluid present  VARICOCELE:  None present  SCROTUM:  Scrotal thickness and appearance within normal limits  No evidence for extratesticular mass or hernia demonstrated  OTHER: Targeted ultrasound of the left lower quadrant in the area of pain demonstrates a noncompressible bowel loop, incompletely evaluated  Impression:  1  No evidence of testicular torsion  2   Targeted ultrasound of the left lower quadrant in the area of pain demonstrates a noncompressible bowel loop, incompletely evaluated  Consider further evaluation with CT abdomen and pelvis, if clinically indicated  Workstation performed: [id number]     Ct Abdomen Pelvis With Contrast    Result Date: 7/31/2020  Narrative: CT ABDOMEN AND PELVIS WITH IV CONTRAST INDICATION:   noncompressible bowel loop on US of LLQ, LLQ and testicular pain   COMPARISON:  Ultrasound obtained earlier today TECHNIQUE:  CT examination of the abdomen and pelvis was performed  Axial, sagittal, and coronal 2D reformatted images were created from the source data and submitted for interpretation  Radiation dose length product (DLP) for this visit:  742 mGy-cm   This examination, like all CT scans performed in the [facility], was performed utilizing techniques to minimize radiation dose exposure, including the use of iterative reconstruction and automated exposure control  IV Contrast:  100 mL of iohexol (OMNIPAQUE) Enteric Contrast:  Enteric contrast was not administered  FINDINGS: ABDOMEN LOWER CHEST:  No clinically significant abnormality identified in the visualized lower chest  LIVER/BILIARY TREE:  Unremarkable  GALLBLADDER:  No calcified gallstones  No pericholecystic inflammatory change  SPLEEN:  Unremarkable  PANCREAS:  Unremarkable  ADRENAL GLANDS:  Unremarkable  KIDNEYS/URETERS:  Unremarkable  No hydronephrosis  STOMACH AND BOWEL:  Colonic diverticulosis with inflammatory changes around the sigmoid colon in the left lower quadrant  APPENDIX:  No findings to suggest appendicitis  ABDOMINOPELVIC CAVITY:  No ascites  No pneumoperitoneum  No lymphadenopathy  VESSELS:  Unremarkable for patient's age  PELVIS REPRODUCTIVE ORGANS:  Unremarkable for patient's age  URINARY BLADDER:  Unremarkable  ABDOMINAL WALL/INGUINAL REGIONS:  Unremarkable  OSSEOUS STRUCTURES:  No acute fracture or destructive osseous lesion  Impression: Colonic diverticulosis with inflammatory changes around the sigmoid colon which may represent acute diverticulitis  No drainable fluid collection  If not already performed recently, colonoscopy after the acute illness is recommended to rule out possibility of colon cancer mimicking diverticulitis   Workstation performed: [id number]

## 2021-03-09 ENCOUNTER — TELEPHONE (OUTPATIENT)
Dept: GASTROENTEROLOGY | Facility: CLINIC | Age: 26
End: 2021-03-09

## 2021-03-10 NOTE — TELEPHONE ENCOUNTER
You saw him for diverticulitis back in august, he never scheduled, just sched or does he need to be seen?

## 2021-03-25 ENCOUNTER — HOSPITAL ENCOUNTER (OUTPATIENT)
Dept: GASTROENTEROLOGY | Facility: HOSPITAL | Age: 26
Setting detail: OUTPATIENT SURGERY
Discharge: HOME | End: 2021-03-25
Attending: INTERNAL MEDICINE | Admitting: INTERNAL MEDICINE
Payer: COMMERCIAL

## 2021-03-25 ENCOUNTER — ANESTHESIA EVENT (OUTPATIENT)
Dept: GASTROENTEROLOGY | Facility: HOSPITAL | Age: 26
End: 2021-03-25

## 2021-03-25 ENCOUNTER — ANESTHESIA (OUTPATIENT)
Dept: GASTROENTEROLOGY | Facility: HOSPITAL | Age: 26
End: 2021-03-25

## 2021-03-25 VITALS
HEART RATE: 54 BPM | HEIGHT: 74 IN | TEMPERATURE: 97.7 F | OXYGEN SATURATION: 98 % | RESPIRATION RATE: 17 BRPM | BODY MASS INDEX: 29.82 KG/M2 | WEIGHT: 232.37 LBS | SYSTOLIC BLOOD PRESSURE: 129 MMHG | DIASTOLIC BLOOD PRESSURE: 71 MMHG

## 2021-03-25 DIAGNOSIS — K57.32 DIVERTICULITIS OF LARGE INTESTINE WITHOUT PERFORATION OR ABSCESS WITHOUT BLEEDING: ICD-10-CM

## 2021-03-25 PROCEDURE — G0121 COLON CA SCRN NOT HI RSK IND: HCPCS | Performed by: INTERNAL MEDICINE

## 2021-03-25 RX ORDER — PROPOFOL 10 MG/ML
INJECTION, EMULSION INTRAVENOUS AS NEEDED
Status: DISCONTINUED | OUTPATIENT
Start: 2021-03-25 | End: 2021-03-25

## 2021-03-25 RX ORDER — LIDOCAINE HYDROCHLORIDE 10 MG/ML
INJECTION, SOLUTION EPIDURAL; INFILTRATION; INTRACAUDAL; PERINEURAL AS NEEDED
Status: DISCONTINUED | OUTPATIENT
Start: 2021-03-25 | End: 2021-03-25

## 2021-03-25 RX ORDER — SODIUM CHLORIDE, SODIUM LACTATE, POTASSIUM CHLORIDE, CALCIUM CHLORIDE 600; 310; 30; 20 MG/100ML; MG/100ML; MG/100ML; MG/100ML
125 INJECTION, SOLUTION INTRAVENOUS CONTINUOUS
Status: DISCONTINUED | OUTPATIENT
Start: 2021-03-25 | End: 2021-03-29 | Stop reason: HOSPADM

## 2021-03-25 RX ADMIN — LIDOCAINE HYDROCHLORIDE 5 ML: 10 INJECTION, SOLUTION EPIDURAL; INFILTRATION; INTRACAUDAL; PERINEURAL at 09:54

## 2021-03-25 RX ADMIN — PROPOFOL 40 MG: 10 INJECTION, EMULSION INTRAVENOUS at 09:58

## 2021-03-25 RX ADMIN — PROPOFOL 70 MG: 10 INJECTION, EMULSION INTRAVENOUS at 09:56

## 2021-03-25 RX ADMIN — PROPOFOL 130 MG: 10 INJECTION, EMULSION INTRAVENOUS at 09:54

## 2021-03-25 RX ADMIN — SODIUM CHLORIDE, SODIUM LACTATE, POTASSIUM CHLORIDE, AND CALCIUM CHLORIDE 125 ML/HR: .6; .31; .03; .02 INJECTION, SOLUTION INTRAVENOUS at 08:49

## 2021-03-25 NOTE — ANESTHESIA POSTPROCEDURE EVALUATION
Post-Op Assessment Note    CV Status:  Stable  Pain Score: 0    Pain management: adequate     Mental Status:  Alert and awake   Hydration Status:  Stable   PONV Controlled:  Controlled   Airway Patency:  Patent and adequate   Two or more mitigation strategies used for obstructive sleep apnea   Post Op Vitals Reviewed: Yes      Staff: CRNA         No complications documented      BP      Temp 97 7 °F (36 5 °C) (03/25/21 1005)    Pulse 83 (03/25/21 1005)   Resp 17 (03/25/21 1005)    SpO2 99 % (03/25/21 1005)

## 2021-03-25 NOTE — DISCHARGE INSTRUCTIONS
Colonoscopy   WHAT YOU NEED TO KNOW:   A colonoscopy is a procedure to examine the inside of your colon (intestine) with a scope  Polyps or tissue growths may have been removed during your colonoscopy  It is normal to feel bloated and to have some abdominal discomfort  You should be passing gas  If you have hemorrhoids or you had polyps removed, you may have a small amount of bleeding  DISCHARGE INSTRUCTIONS:   Call your doctor if:   · You have a large amount of bright red blood in your bowel movements  · Your abdomen is hard and firm and you have severe pain  · You have sudden trouble breathing  · You develop a rash or hives  · You have a fever within 24 hours of your procedure  · You have not had a bowel movement for 3 days after your procedure  · You have questions or concerns about your condition or care  After your colonoscopy:   · Do not lift, strain, or run  for 3 days  · Rest as much as possible  You have been given medicine to relax you  Do not  drive or make important decisions for at least 24 hours  Return to your normal activity as directed  · Relieve gas and discomfort from bloating  by lying on your left side with a heating pad on your abdomen  You may need to take short walks to help the gas move out  Eat small meals until bloating is relieved  If you had polyps removed: For 7 days after your procedure:  · Do not  take aspirin  · Do not  go on long car rides  Help prevent constipation:   · Eat a variety of healthy foods  Healthy foods include fruit, vegetables, whole-grain breads, low-fat dairy products, beans, lean meat, and fish  Ask if you need to be on a special diet  Your healthcare provider may recommend that you eat high-fiber foods such as cooked beans  Fiber helps you have regular bowel movements  · Drink liquids as directed  Adults should drink between 9 and 13 eight-ounce cups of liquid every day  Ask what amount is best for you   For most people, good liquids to drink are water, juice, and milk  · Exercise as directed  Talk to your healthcare provider about the best exercise plan for you  Exercise can help prevent constipation, decrease your blood pressure and improve your health  Follow up with your healthcare provider as directed:  Write down your questions so you remember to ask them during your visits  © Copyright [address] Information is for End User's use only and may not be sold, redistributed or otherwise used for commercial purposes  All illustrations and images included in CareNotes® are the copyrighted property of A D A M , [employer]  or [address] [place], [place] [address]shameka Valdez   The above information is an  only  It is not intended as medical advice for individual conditions or treatments  Talk to your doctor, nurse or pharmacist before following any medical regimen to see if it is safe and effective for you

## 2021-03-25 NOTE — ANESTHESIA PREPROCEDURE EVALUATION
Procedure:  COLONOSCOPY    Relevant Problems   NEURO/PSYCH   (+) Anxiety   (+) PTSD (post-traumatic stress disorder)      Anxiety   Herpes simplex type 2 infection   PTSD (post-traumatic stress disorder)   Medical marijuana use   Alcohol abuse   Obesity, Class I, BMI 30 0-34 9 (see actual BMI)       Physical Exam    Airway    Mallampati score: III  TM Distance: >3 FB  Neck ROM: full     Dental       Cardiovascular  Cardiovascular exam normal    Pulmonary  Pulmonary exam normal     Other Findings           Anesthesia Plan  ASA Score- 2     Anesthesia Type- IV sedation with anesthesia with ASA Monitors  Additional Monitors:   Airway Plan:           Plan Factors-Exercise tolerance (METS): >4 METS  Chart reviewed  Existing labs reviewed  Patient summary reviewed  Induction- intravenous  Postoperative Plan-     Informed Consent- Anesthetic plan and risks discussed with patient  I personally reviewed this patient with the CRNA  Discussed and agreed on the Anesthesia Plan with the CRNA  Maciej Plascencia

## 2021-03-25 NOTE — H&P
History and Physical - [facility]  Yunior Olguin 31 y o  male MRN: [id number]                  HPI: Yunior Olguin is a 31y o  year old male who presents for left lower quadrant pain, diverticulitis, abnormal CT patient is here for colonoscopy      REVIEW OF SYSTEMS: Per the HPI, and otherwise unremarkable  Historical Information   Past Medical History:   Diagnosis Date    Anxiety      History reviewed  No pertinent surgical history  Social History   Social History     Substance and Sexual Activity   Alcohol Use Not Currently    Alcohol/week: 6 0 standard drinks    Types: 6 Cans of beer per week    Frequency: 2-3 times a week     Social History     Substance and Sexual Activity   Drug Use Yes    Frequency: 4 0 times per week    Types: Marijuana     Social History     Tobacco Use   Smoking Status Former Smoker    Years: 7 00    Types: Cigarettes    Quit date: 2021    Years since quittin 0   Smokeless Tobacco Current User     Family History   Problem Relation Age of Onset    No Known Problems Mother     No Known Problems Father     No Known Problems Sister     No Known Problems Brother        Meds/Allergies     (Not in a hospital admission)      No Known Allergies    Objective     Blood pressure 129/72, pulse 71, temperature 98 2 °F (36 8 °C), temperature source Temporal, resp  rate 12, height 6' 2" (1 88 m), weight 105 kg (232 lb 5 8 oz), SpO2 99 %        PHYSICAL EXAM    Gen: NAD  CV: RRR  CHEST: Clear  ABD: soft, NT/ND  EXT: no edema  Neuro: AAO      ASSESSMENT/PLAN:  This is a 31y o  year old male here for left lower quadrant pain, abnormal CT, diverticulitis    PLAN:   Procedure:  Colonoscopy

## 2021-11-10 ENCOUNTER — OFFICE VISIT (OUTPATIENT)
Dept: FAMILY MEDICINE CLINIC | Facility: CLINIC | Age: 26
End: 2021-11-10
Payer: COMMERCIAL

## 2021-11-10 VITALS
HEART RATE: 78 BPM | DIASTOLIC BLOOD PRESSURE: 70 MMHG | HEIGHT: 74 IN | OXYGEN SATURATION: 98 % | RESPIRATION RATE: 18 BRPM | WEIGHT: 216.8 LBS | TEMPERATURE: 98.6 F | BODY MASS INDEX: 27.82 KG/M2 | SYSTOLIC BLOOD PRESSURE: 128 MMHG

## 2021-11-10 DIAGNOSIS — F41.9 ANXIETY: Primary | ICD-10-CM

## 2021-11-10 DIAGNOSIS — Z00.00 ANNUAL PHYSICAL EXAM: ICD-10-CM

## 2021-11-10 DIAGNOSIS — F10.10 ALCOHOL ABUSE: ICD-10-CM

## 2021-11-10 PROCEDURE — 3008F BODY MASS INDEX DOCD: CPT | Performed by: FAMILY MEDICINE

## 2021-11-10 PROCEDURE — 1036F TOBACCO NON-USER: CPT | Performed by: FAMILY MEDICINE

## 2021-11-10 PROCEDURE — 99395 PREV VISIT EST AGE 18-39: CPT | Performed by: FAMILY MEDICINE

## 2021-11-10 PROCEDURE — 3725F SCREEN DEPRESSION PERFORMED: CPT | Performed by: FAMILY MEDICINE

## 2022-02-02 ENCOUNTER — TELEMEDICINE (OUTPATIENT)
Dept: FAMILY MEDICINE CLINIC | Facility: CLINIC | Age: 27
End: 2022-02-02

## 2022-02-02 ENCOUNTER — TELEPHONE (OUTPATIENT)
Dept: FAMILY MEDICINE CLINIC | Facility: CLINIC | Age: 27
End: 2022-02-02

## 2022-02-02 DIAGNOSIS — U07.1 COVID-19: ICD-10-CM

## 2022-02-02 DIAGNOSIS — U07.1 COVID-19: Primary | ICD-10-CM

## 2022-02-02 PROCEDURE — 99213 OFFICE O/P EST LOW 20 MIN: CPT | Performed by: FAMILY MEDICINE

## 2022-02-02 RX ORDER — AZITHROMYCIN 250 MG/1
TABLET, FILM COATED ORAL
Qty: 6 TABLET | Refills: 0 | Status: SHIPPED | OUTPATIENT
Start: 2022-02-02 | End: 2022-02-02 | Stop reason: SDUPTHER

## 2022-02-02 RX ORDER — AZITHROMYCIN 250 MG/1
TABLET, FILM COATED ORAL
Qty: 6 TABLET | Refills: 0 | Status: SHIPPED | OUTPATIENT
Start: 2022-02-02 | End: 2022-02-07

## 2022-02-02 NOTE — PROGRESS NOTES
COVID-19 Outpatient Progress Note    Assessment/Plan:    Problem List Items Addressed This Visit        Other    COVID-19 - Primary     Covid infection with diarrhea and LLq pain concerned about diverticulitis now requesting abx  Hold abx 24-48 hrs now only and increase fiber Juicy juice  Stay at home self quarantine take vitamin-C 1000 mg vitamin-D 5000 units Z and 50 mg drink plenty of fluids start with fiber in your diet now to help with the bowel cramping if symptoms worsen significantly and bowel pain does not resolve after 2 straight hours and you develop a fever than go for evaluation at the emergency room  Disposition:     I recommended self-quarantine for 10 days and to watch for symptoms until 14 days after exposure  If patient were to develop symptoms, they should self isolate and call our office for further guidance  I have spent 22 minutes directly with the patient  Greater than 50% of this time was spent in counseling/coordination of care regarding: diagnostic results, prognosis, risks and benefits of treatment options, instructions for management, patient and family education, importance of treatment compliance, risk factor reductions and impressions  Encounter provider Leila Gunn DO    Provider located at [place] [address]  [phone]    Recent Visits  No visits were found meeting these conditions  Showing recent visits within past 7 days and meeting all other requirements  Today's Visits  Date Type Provider Dept   02/02/22 Telemedicine Leila Gunn DO [address] [facility] today's visits and meeting all other requirements  Future Appointments  No visits were found meeting these conditions    Showing future appointments within next 150 days and meeting all other requirements     This virtual check-in was done via [employer] and patient was informed that this is a secure, HIPAA-compliant platform  He agrees to proceed  Patient agrees to participate in a virtual check in via telephone or video visit instead of presenting to the office to address urgent/immediate medical needs  Patient is aware this is a billable service  After connecting through [facility], the patient was identified by name and date of birth  Trina Ruiz was informed that this was a telemedicine visit and that the exam was being conducted confidentially over secure lines  My office door was closed  No one else was in the room  Trina Ruiz acknowledged consent and understanding of privacy and security of the telemedicine visit  I informed the patient that I have reviewed his record in Epic and presented the opportunity for him to ask any questions regarding the visit today  The patient agreed to participate  Verification of patient location:  Patient is located in the following state in which I hold an active license: [place]    Subjective:   Trina Ruiz is a 31 y o  male who is concerned about COVID-19  Patient's symptoms include chills, abdominal pain, nausea and diarrhea  Patient denies fever, fatigue, congestion, rhinorrhea, sore throat, cough, shortness of breath, myalgias and headaches           COVID-19 vaccination status: Partially vaccinated    Exposure:   Contact with a person who is under investigation (PUI) for or who is positive for COVID-19 within the last 14 days?: Yes    Hospitalized recently for fever and/or lower respiratory symptoms?: No      Currently a healthcare worker that is involved in direct patient care?: No      Works in a special setting where the risk of COVID-19 transmission may be high? (this may include long-term care, correctional and group home facilities; homeless shelters; assisted-living facilities and group homes ): No      Resident in a special setting where the risk of COVID-19 transmission may be high? (this may include long-term care, correctional and group home facilities; homeless shelters; assisted-living facilities and group homes ): No      Lab Results   Component Value Date    SARSCOV2 Negative 03/18/2021     Past Medical History:   Diagnosis Date    Anxiety      No past surgical history on file  No current outpatient medications on file  No current facility-administered medications for this visit  No Known Allergies    Review of Systems   Constitutional: Positive for chills  Negative for fatigue and fever  HENT: Negative for congestion, nosebleeds, rhinorrhea, sinus pressure and sore throat  Eyes: Negative for discharge and redness  Respiratory: Negative for cough and shortness of breath  Cardiovascular: Negative for chest pain, palpitations and leg swelling  Gastrointestinal: Positive for abdominal pain, diarrhea and nausea  Negative for blood in stool  Endocrine: Negative for cold intolerance, heat intolerance and polyuria  Genitourinary: Negative for dysuria and frequency  Musculoskeletal: Negative for arthralgias, back pain and myalgias  Skin: Negative for rash  Neurological: Negative for dizziness, weakness and headaches  Hematological: Negative for adenopathy  Psychiatric/Behavioral: Negative for behavioral problems and sleep disturbance  The patient is not nervous/anxious  Objective: There were no vitals filed for this visit  Physical Exam  Vitals and nursing note reviewed  Constitutional:       Appearance: Normal appearance  He is well-developed and normal weight  HENT:      Head: Normocephalic and atraumatic  Nose: Nose normal       Mouth/Throat:      Mouth: Mucous membranes are moist    Eyes:      Extraocular Movements: Extraocular movements intact  Conjunctiva/sclera: Conjunctivae normal       Pupils: Pupils are equal, round, and reactive to light  Cardiovascular:      Rate and Rhythm: Normal rate and regular rhythm  Heart sounds: No murmur heard        Pulmonary:      Effort: Pulmonary effort is normal  No respiratory distress  Breath sounds: Normal breath sounds  Abdominal:      Palpations: Abdomen is soft  Tenderness: There is abdominal tenderness  Musculoskeletal:      Cervical back: Neck supple  Skin:     General: Skin is warm and dry  Neurological:      General: No focal deficit present  Mental Status: He is alert and oriented to person, place, and time  Mental status is at baseline  Psychiatric:         Mood and Affect: Mood normal          Behavior: Behavior normal          Thought Content: Thought content normal          Judgment: Judgment normal          VIRTUAL VISIT DISCLAIMER    Amanda Speaks verbally agrees to participate in [employer]  Pt is aware that [employer] could be limited without vital signs or the ability to perform a full hands-on physical [place] Harmon understands he or the provider may request at any time to terminate the video visit and request the patient to seek care or treatment in person

## 2022-02-02 NOTE — ASSESSMENT & PLAN NOTE
Covid infection with diarrhea and LLq pain concerned about diverticulitis now requesting abx  Hold abx 24-48 hrs now only and increase fiber Juicy juice  Stay at home self quarantine take vitamin-C 1000 mg vitamin-D 5000 units Z and 50 mg drink plenty of fluids start with fiber in your diet now to help with the bowel cramping if symptoms worsen significantly and bowel pain does not resolve after 2 straight hours and you develop a fever than go for evaluation at the emergency room

## 2022-03-25 ENCOUNTER — OFFICE VISIT (OUTPATIENT)
Dept: FAMILY MEDICINE CLINIC | Facility: CLINIC | Age: 27
End: 2022-03-25

## 2022-03-25 VITALS
HEIGHT: 74 IN | WEIGHT: 216 LBS | OXYGEN SATURATION: 97 % | DIASTOLIC BLOOD PRESSURE: 76 MMHG | SYSTOLIC BLOOD PRESSURE: 122 MMHG | TEMPERATURE: 98.7 F | BODY MASS INDEX: 27.72 KG/M2 | RESPIRATION RATE: 18 BRPM | HEART RATE: 83 BPM

## 2022-03-25 DIAGNOSIS — H83.01 LABYRINTHITIS OF RIGHT EAR: Primary | ICD-10-CM

## 2022-03-25 PROCEDURE — 99213 OFFICE O/P EST LOW 20 MIN: CPT | Performed by: FAMILY MEDICINE

## 2022-03-25 RX ORDER — IBUPROFEN 800 MG/1
TABLET ORAL
COMMUNITY
Start: 2022-03-03

## 2022-03-25 RX ORDER — ALPRAZOLAM 1 MG/1
1 TABLET ORAL 2 TIMES DAILY PRN
COMMUNITY
Start: 2022-03-16 | End: 2022-09-12

## 2022-03-25 RX ORDER — MECLIZINE HCL 12.5 MG/1
12.5 TABLET ORAL 3 TIMES DAILY PRN
COMMUNITY
Start: 2022-03-22

## 2022-03-25 NOTE — PROGRESS NOTES
Assessment/Plan:       Problem List Items Addressed This Visit        Nervous and Auditory    Labyrinthitis of right ear - Primary     Dull TM and benign vertigo sx using Meclizine now PRN  Minimal sx on exam today  Pt reassured  Had panic attacks and left fractured ankle  Subjective:      Patient ID: Hu Corey is a 28 y o  male  Patient presents today for follow-up evaluation after he was seen at the emergency department at [facility] for vertigo and dizziness symptoms      The following portions of the patient's history were reviewed and updated as appropriate: allergies, current medications, past family history, past medical history, past social history, past surgical history and problem list     Review of Systems   Constitutional: Negative for chills, fatigue and fever  HENT: Negative for congestion, nosebleeds, rhinorrhea, sinus pressure and sore throat  Eyes: Negative for discharge and redness  Respiratory: Negative for cough and shortness of breath  Cardiovascular: Negative for chest pain, palpitations and leg swelling  Gastrointestinal: Negative for abdominal pain, blood in stool and nausea  Endocrine: Negative for cold intolerance, heat intolerance and polyuria  Genitourinary: Negative for dysuria and frequency  Musculoskeletal: Negative for arthralgias, back pain and myalgias  Skin: Negative for rash  Neurological: Positive for dizziness  Negative for weakness and headaches  Hematological: Negative for adenopathy  Psychiatric/Behavioral: Negative for behavioral problems and sleep disturbance  The patient is not nervous/anxious  Objective:      /76 (BP Location: Left arm, Patient Position: Sitting)   Pulse 83   Temp 98 7 °F (37 1 °C)   Resp 18   Ht 6' 2" (1 88 m)   Wt 98 kg (216 lb)   SpO2 97%   BMI 27 73 kg/m²        Physical Exam  Vitals and nursing note reviewed  Constitutional:       Appearance: Normal appearance   He is well-developed and normal weight  HENT:      Head: Normocephalic and atraumatic  Right Ear: Tympanic membrane, ear canal and external ear normal       Left Ear: Tympanic membrane, ear canal and external ear normal       Nose: Nose normal       Mouth/Throat:      Mouth: Mucous membranes are moist       Pharynx: Oropharynx is clear  Eyes:      General: No scleral icterus  Extraocular Movements: Extraocular movements intact  Conjunctiva/sclera: Conjunctivae normal       Pupils: Pupils are equal, round, and reactive to light  Neck:      Thyroid: No thyromegaly  Vascular: No JVD  Cardiovascular:      Rate and Rhythm: Normal rate and regular rhythm  Pulses: Normal pulses  Heart sounds: Normal heart sounds  No murmur heard  Pulmonary:      Effort: Pulmonary effort is normal       Breath sounds: Normal breath sounds  No wheezing or rales  Chest:      Chest wall: No tenderness  Abdominal:      General: Bowel sounds are normal  There is no distension  Palpations: Abdomen is soft  There is no mass  Tenderness: There is no abdominal tenderness  There is no guarding or rebound  Musculoskeletal:         General: No tenderness or deformity  Normal range of motion  Cervical back: Normal range of motion and neck supple  Lymphadenopathy:      Cervical: No cervical adenopathy  Skin:     General: Skin is warm and dry  Capillary Refill: Capillary refill takes less than 2 seconds  Findings: No erythema or rash  Neurological:      General: No focal deficit present  Mental Status: He is alert and oriented to person, place, and time  Mental status is at baseline  Cranial Nerves: No cranial nerve deficit  Deep Tendon Reflexes: Reflexes are normal and symmetric  Reflexes normal    Psychiatric:         Mood and Affect: Mood normal          Behavior: Behavior normal          Thought Content:  Thought content normal          Judgment: Judgment normal  Data:    Laboratory Results: I have personally reviewed the pertinent laboratory results/reports   Radiology/Other Diagnostic Testing Results: I have personally reviewed pertinent reports         Lab Results   Component Value Date    WBC 15 66 (H) 07/31/2020    HGB 16 6 07/31/2020    HCT 48 4 07/31/2020    MCV 90 07/31/2020     07/31/2020     Lab Results   Component Value Date    K 4 6 07/31/2020     07/31/2020    CO2 27 07/31/2020    BUN 12 07/31/2020    CREATININE 0 89 07/31/2020    CALCIUM 9 7 07/31/2020    EGFR 118 07/31/2020     No results found for: CHOLESTEROL  No results found for: HDL  No results found for: LDLCALC  No results found for: TRIG  No results found for: CHOLHDL  No results found for: [id number], TSH  No results found for: HGBA1C  No results found for: PSA    Baird, DO

## 2024-03-27 ENCOUNTER — OFFICE VISIT (OUTPATIENT)
Dept: FAMILY MEDICINE CLINIC | Facility: CLINIC | Age: 29
End: 2024-03-27

## 2024-03-27 VITALS
WEIGHT: 190.4 LBS | SYSTOLIC BLOOD PRESSURE: 115 MMHG | HEIGHT: 74 IN | RESPIRATION RATE: 18 BRPM | TEMPERATURE: 98 F | HEART RATE: 74 BPM | OXYGEN SATURATION: 99 % | DIASTOLIC BLOOD PRESSURE: 67 MMHG | BODY MASS INDEX: 24.43 KG/M2

## 2024-03-27 DIAGNOSIS — J06.9 VIRAL URI: Primary | ICD-10-CM

## 2024-03-27 DIAGNOSIS — F41.9 ANXIETY: ICD-10-CM

## 2024-03-27 DIAGNOSIS — Z00.00 ANNUAL PHYSICAL EXAM: ICD-10-CM

## 2024-03-27 PROBLEM — F10.10 ALCOHOL ABUSE: Status: RESOLVED | Noted: 2020-02-27 | Resolved: 2024-03-27

## 2024-03-27 PROCEDURE — 99395 PREV VISIT EST AGE 18-39: CPT | Performed by: FAMILY MEDICINE

## 2024-03-27 RX ORDER — AMOXICILLIN 500 MG/1
1000 TABLET, FILM COATED ORAL 2 TIMES DAILY
Qty: 28 TABLET | Refills: 1 | Status: SHIPPED | OUTPATIENT
Start: 2024-03-27 | End: 2024-04-03

## 2024-03-27 NOTE — ASSESSMENT & PLAN NOTE
Stable currently overall doing better sleep patterns are good appetite good working at his job his relationship is stable. Concerned about travel on highways etc.

## 2024-03-27 NOTE — PROGRESS NOTES
ADULT ANNUAL PHYSICAL  [facility]    NAME: Noman Fitzgerald  AGE: 29 y.o. SEX: male  : 1995     DATE: 3/27/2024     Assessment and Plan:     Problem List Items Addressed This Visit          Respiratory    Viral URI - Primary     Resolving sx now no new tx and will return to work Friday-off tomorrow            Behavioral Health    Anxiety     Stable currently overall doing better sleep patterns are good appetite good working at his job his relationship is stable. Concerned about travel on highways etc.            Other    Annual physical exam    Relevant Orders    CBC and differential    Comprehensive metabolic panel    Lipid panel    TSH, 3rd generation with Free T4 reflex       Immunizations and preventive care screenings were discussed with patient today. Appropriate education was printed on patient's after visit summary.    Counseling:  Alcohol/drug use: discussed moderation in alcohol intake, the recommendations for healthy alcohol use, and avoidance of illicit drug use.  Dental Health: discussed importance of regular tooth brushing, flossing, and dental visits.  Injury prevention: discussed safety/seat belts, safety helmets, smoke detectors, carbon dioxide detectors, and smoking near bedding or upholstery.  Sexual health: discussed sexually transmitted diseases, partner selection, use of condoms, avoidance of unintended pregnancy, and contraceptive alternatives.  Exercise: the importance of regular exercise/physical activity was discussed. Recommend exercise 3-5 times per week for at least 30 minutes.          Return in about 1 year (around 3/27/2025) for Annual physical.     Chief Complaint:     Chief Complaint   Patient presents with    Nausea    Generalized Body Aches    Sore Throat    Headache      History of Present Illness:     Adult Annual Physical   Patient here for a comprehensive physical exam. The patient reports no  problems.    Diet and Physical Activity  Diet/Nutrition: well balanced diet.   Exercise: no formal exercise.      Depression Screening  PHQ-2/9 Depression Screening    Little interest or pleasure in doing things: 0 - not at all  Feeling down, depressed, or hopeless: 1 - several days  PHQ-2 Score: 1  PHQ-2 Interpretation: Negative depression screen       General Health  Sleep: sleeps well.   Hearing: normal - bilateral.  Vision: no vision problems.   Dental: regular dental visits.        Health  History of STDs?: .    Advanced Care Planning  Do you have an advanced directive?   Do you have a durable medical power of ?   ACP document given to the patient?      Review of Systems:     Review of Systems   Constitutional:  Negative for chills, fatigue and fever.   HENT:  Negative for congestion, nosebleeds, rhinorrhea, sinus pressure and sore throat.    Eyes:  Negative for discharge and redness.   Respiratory:  Negative for cough and shortness of breath.    Cardiovascular:  Negative for chest pain, palpitations and leg swelling.   Gastrointestinal:  Negative for abdominal pain, blood in stool and nausea.   Endocrine: Negative for cold intolerance, heat intolerance and polyuria.   Genitourinary:  Negative for dysuria and frequency.   Musculoskeletal:  Negative for arthralgias, back pain and myalgias.   Skin:  Negative for rash.   Neurological:  Negative for dizziness, weakness and headaches.   Hematological:  Negative for adenopathy.   Psychiatric/Behavioral:  Negative for behavioral problems and sleep disturbance. The patient is not nervous/anxious.       Past Medical History:     Past Medical History:   Diagnosis Date    Alcohol abuse 02/27/2020    Anxiety       Past Surgical History:     History reviewed. No pertinent surgical history.   Social History:     Social History     Socioeconomic History    Marital status: Single     Spouse name: None    Number of children: None    Years of education: None    Highest  education level: None   Occupational History    None   Tobacco Use    Smoking status: Former     Current packs/day: 0.25     Average packs/day: 0.3 packs/day for 7.7 years (1.9 ttl pk-yrs)     Types: Cigarettes     Start date: 2/25/2014     Quit date: 2/25/2021    Smokeless tobacco: Current   Vaping Use    Vaping status: Former    Substances: Nicotine, THC   Substance and Sexual Activity    Alcohol use: Not Currently     Alcohol/week: 6.0 standard drinks of alcohol     Types: 6 Cans of beer per week    Drug use: Yes     Frequency: 4.0 times per week     Types: Marijuana    Sexual activity: Yes     Partners: Female   Other Topics Concern    None   Social History Narrative    None     Social Determinants of Health     Financial Resource Strain: Not on file   Food Insecurity: Not on file   Transportation Needs: Not on file   Physical Activity: Not on file   Stress: Not on file   Social Connections: Not on file   Intimate Partner Violence: Not on file   Housing Stability: Not on file      Family History:     Family History   Problem Relation Age of Onset    No Known Problems Mother     No Known Problems Father     No Known Problems Sister     No Known Problems Brother       Current Medications:     Current Outpatient Medications   Medication Sig Dispense Refill    ibuprofen (MOTRIN) 800 mg tablet TAKE 1 TABLET BY MOUTH EVERY 6 HOURS AS NEEDED FOR MILD PAIN (PAIN SCORE 1-3).      ALPRAZolam (XANAX) 1 mg tablet Take 1 mg by mouth 2 (two) times a day as needed      meclizine (ANTIVERT) 12.5 MG tablet Take 12.5 mg by mouth Three times daily as needed (Patient not taking: Reported on 3/27/2024)      Pseudoephedrine-Acetaminophen (SM NON-ASPRIN SINUS PO) TAKE 1 TABLET BY MOUTH TWICE A DAY (Patient not taking: Reported on 3/27/2024)       No current facility-administered medications for this visit.      Allergies:     No Known Allergies   Physical Exam:     /67 (BP Location: Right arm, Patient Position: Sitting, Cuff  "Size: Standard)   Pulse 74   Temp 98 °F (36.7 °C) (Tympanic)   Resp 18   Ht 6' 2\" (1.88 m)   Wt 86.4 kg (190 lb 6.4 oz)   SpO2 99%   BMI 24.45 kg/m²     Physical Exam  Vitals and nursing note reviewed.   Constitutional:       General: He is not in acute distress.     Appearance: He is well-developed.   HENT:      Head: Normocephalic and atraumatic.      Right Ear: Tympanic membrane normal.      Left Ear: Tympanic membrane normal.   Eyes:      Conjunctiva/sclera: Conjunctivae normal.   Cardiovascular:      Rate and Rhythm: Normal rate and regular rhythm.      Heart sounds: No murmur heard.  Pulmonary:      Effort: Pulmonary effort is normal. No respiratory distress.      Breath sounds: Normal breath sounds.   Abdominal:      Palpations: Abdomen is soft.      Tenderness: There is no abdominal tenderness.   Musculoskeletal:         General: No swelling.      Cervical back: Normal range of motion and neck supple.   Skin:     General: Skin is warm and dry.      Capillary Refill: Capillary refill takes less than 2 seconds.   Neurological:      General: No focal deficit present.      Mental Status: He is alert.   Psychiatric:         Mood and Affect: Mood normal.          Amilcar Pugh,    [facility]    "

## 2024-03-29 ENCOUNTER — TELEPHONE (OUTPATIENT)
Dept: FAMILY MEDICINE CLINIC | Facility: CLINIC | Age: 29
End: 2024-03-29

## 2024-03-29 NOTE — TELEPHONE ENCOUNTER
I spoke to patient he was made aware of message  
Pt called stating that his sore throat  isn't any better since Monday   Pt would like to know if he can start taking the antibiotic that you had prescribe him when he saw you on 03/27/2024    
n/a

## 2024-05-01 PROBLEM — J06.9 VIRAL URI: Status: RESOLVED | Noted: 2024-03-27 | Resolved: 2024-05-01

## 2024-09-11 ENCOUNTER — TELEPHONE (OUTPATIENT)
Dept: FAMILY MEDICINE CLINIC | Facility: CLINIC | Age: 29
End: 2024-09-11

## 2025-07-03 ENCOUNTER — TELEPHONE (OUTPATIENT)
Dept: FAMILY MEDICINE CLINIC | Facility: CLINIC | Age: 30
End: 2025-07-03

## 2025-07-03 NOTE — TELEPHONE ENCOUNTER
Tried to call pt to remind to schedule their PHY due to being overdue. Pt did not answer the call would just ring without an option of leaving a VM.
